# Patient Record
Sex: MALE | Race: BLACK OR AFRICAN AMERICAN | Employment: FULL TIME | URBAN - METROPOLITAN AREA
[De-identification: names, ages, dates, MRNs, and addresses within clinical notes are randomized per-mention and may not be internally consistent; named-entity substitution may affect disease eponyms.]

---

## 2021-02-06 ENCOUNTER — HOSPITAL ENCOUNTER (EMERGENCY)
Age: 26
Discharge: HOME | End: 2021-02-06
Attending: EMERGENCY MEDICINE
Payer: COMMERCIAL

## 2021-02-06 VITALS
HEART RATE: 54 BPM | OXYGEN SATURATION: 98 % | WEIGHT: 235 LBS | BODY MASS INDEX: 31.83 KG/M2 | TEMPERATURE: 98.5 F | DIASTOLIC BLOOD PRESSURE: 80 MMHG | HEIGHT: 72 IN | SYSTOLIC BLOOD PRESSURE: 135 MMHG | RESPIRATION RATE: 18 BRPM

## 2021-02-06 DIAGNOSIS — M54.41 ACUTE MIDLINE LOW BACK PAIN WITH RIGHT-SIDED SCIATICA: Primary | ICD-10-CM

## 2021-02-06 DIAGNOSIS — H61.23 BILATERAL IMPACTED CERUMEN: ICD-10-CM

## 2021-02-06 PROCEDURE — 74011250637 HC RX REV CODE- 250/637: Performed by: PHYSICIAN ASSISTANT

## 2021-02-06 PROCEDURE — 99284 EMERGENCY DEPT VISIT MOD MDM: CPT

## 2021-02-06 RX ADMIN — CARBAMIDE PEROXIDE 6.5% 5 DROP: 6.5 LIQUID AURICULAR (OTIC) at 16:20

## 2021-02-06 NOTE — LETTER
[facility] EMERGENCY DEPT 
[address] [facility] [address] 
[phone] Work/School Note Date: 2/6/2021 To Whom It May concern: 
 
Mika Mcleod was seen and treated today in the emergency room by the following provider(s): 
Attending Provider: Odell Boast, DO Physician Assistant: Paulina Walker. The tympanic membrane is completely visible and intact b/l.   
 
Sincerely, 
 
 
 
 
GHADA Rivera

## 2021-02-06 NOTE — DISCHARGE INSTRUCTIONS
Take medication as prescribed. Schedule MRI as an outpatient. Follow-up with your primary care physician within 2 days for reassessment. Bring the results from this visit with you for their review. Return to the ED immediately for any new, worsening, or persistent symptoms.

## 2021-02-06 NOTE — ED TRIAGE NOTES
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 He c/o lower back pain and states that he experienced paresthesias to right foot following injury. Denies paresthesias at present. He presents today for referral for MRI of spine. He also requests evaluation of ear wax problem to bilateral ears.

## 2021-02-06 NOTE — ED NOTES
I have reviewed discharge instructions with the patient. The patient verbalized understanding.  Pt dc'd by PA

## 2021-02-06 NOTE — ED PROVIDER NOTES
EMERGENCY DEPARTMENT HISTORY AND PHYSICAL EXAM    2:51 PM      Date: 2/6/2021  Patient Name: Guevara Noland    History of Presenting Illness     Chief Complaint   Patient presents with   [facility] Fall    Back Pain    Referral Request    Wax in Ear       History Provided By: Patient    Additional History (Context): Guevara Noland is a 30 y.o. male with No significant past medical history who presents to the ED due to right-sided/midline low back pain associated with numbness and tingling radiating to the right lower extremity x2 weeks. Patient notes he fell about 3 feet onto his back at work. Notes numbness and tingling has resolved. Notes he was evaluated by a neurosurgeon who recommended outpatient MRI without contrast.  Notes he came to the ED for referral for MRI. Patient denies fever or chills, urinary or bowel incontinence or retention, history of IV drug abuse, history of cancer, saddle anesthesia, weakness of extremity. Also has paperwork that states he has bilateral cerumen impaction. Notes he needs ear irrigation for clearance for work. Denies dizziness, headache, ear drainage, or any other medical concerns. PCP: None      Past History     Past Medical History:  History reviewed. No pertinent past medical history. Past Surgical History:  History reviewed. No pertinent surgical history. Family History:  History reviewed. No pertinent family history. Social History:  Social History     Tobacco Use    Smoking status: Never Smoker    Smokeless tobacco: Never Used   Substance Use Topics    Alcohol use: No    Drug use: No       Allergies:  No Known Allergies      Review of Systems       Review of Systems   Constitutional: Negative for chills and fever. HENT: Positive for ear pain. Respiratory: Negative for shortness of breath. Cardiovascular: Negative for chest pain. Gastrointestinal: Negative for abdominal pain, nausea and vomiting. Musculoskeletal: Positive for back pain. Skin: Negative for rash. Neurological: Positive for numbness. Negative for weakness. All other systems reviewed and are negative. Physical Exam     Visit Vitals  /80 (BP 1 Location: Left upper arm, BP Patient Position: At rest;Sitting)   Pulse (!) 54   Temp 98.5 °F (36.9 °C)   Resp 18   Ht 6' (1.829 m)   Wt 106.6 kg (235 lb)   SpO2 98%   BMI 31.87 kg/m²         Physical Exam  Vitals signs and nursing note reviewed. Constitutional:       General: He is not in acute distress. Appearance: Normal appearance. He is well-developed. He is not ill-appearing, toxic-appearing or diaphoretic. HENT:      Head: Normocephalic and atraumatic. Right Ear: There is impacted cerumen. Left Ear: There is impacted cerumen. Mouth/Throat:      Mouth: Mucous membranes are moist.      Pharynx: No oropharyngeal exudate or posterior oropharyngeal erythema. Neck:      Musculoskeletal: Normal range of motion and neck supple. No neck rigidity. Cardiovascular:      Rate and Rhythm: Normal rate and regular rhythm. Heart sounds: Normal heart sounds. No murmur. No friction rub. No gallop. Pulmonary:      Effort: Pulmonary effort is normal. No respiratory distress. Breath sounds: Normal breath sounds. No wheezing or rales. Abdominal:      General: Bowel sounds are normal. There is no distension. Palpations: Abdomen is soft. Tenderness: There is no abdominal tenderness. There is no guarding or rebound. Musculoskeletal: Normal range of motion. Lumbar back: He exhibits tenderness (right lumbar paravertebrals TTP). He exhibits normal range of motion, no bony tenderness, no swelling, no edema, no deformity and no spasm. Comments: No saddle anesthesia, negative SLR b/l, full ROM of hip and knee, normal gait    Lymphadenopathy:      Cervical: No cervical adenopathy. Skin:     General: Skin is warm and dry. Findings: No rash.    Neurological:      Mental Status: He is alert.           Diagnostic Study Results     Labs -  No results found for this or any previous visit (from the past 12 hour(s)). Radiologic Studies -   MRI LUMB SPINE WO CONT    (Results Pending)         Medical Decision Making   I am the first provider for this patient. I reviewed the vital signs, available nursing notes, past medical history, past surgical history, family history and social history. Vital Signs-Reviewed the patient's vital signs. Records Reviewed: Nursing Notes and Old Medical Records (Time of Review: 2:51 PM)    ED Course: Progress Notes, Reevaluation, and Consults:  5:10 PM: Ear irrigation completed by nurse Kym Easton. TM intact bilaterally, canal clear of any wax. No evidence of trauma. Reviewed plan with patient. Discussed need for close outpatient follow-up. Discussed strict return precautions, including fever, weakness, or any other medical concerns. Provider Notes (Medical Decision Making): 49-year-old male who presents to the ED due to right-sided low back pain associated with numbness and tingling to the right lower extremity. Afebrile, nontoxic-appearing, looks well. No red flag symptoms. Do not suspect cauda equina, epidural abscess. Provided referral for MRI. Patient also with bilateral cerumen impaction, resolved with irrigation. No evidence of otitis media/externa, mastoiditis. Stable for discharge with symptomatic management, close outpatient follow-up for further assessment. Diagnosis     Clinical Impression:   1. Acute midline low back pain with right-sided sciatica    2.  Bilateral impacted cerumen        Disposition: home     Follow-up Information     Follow up With Specialties Details Why [place] [address] EMERGENCY DEPT Emergency Medicine  If symptoms worsen [address] [place] [address]  Schedule an appointment as soon as possible for a visit   [address] [facility] [address] [facility]           Patient's Medications   Start Taking    CARBAMIDE PEROXIDE (DEBROX) 6.5 % OTIC SOLUTION    Apply 5 drops to affect ear(s) two times a day until ear is no longer clogged. Keep drops in for 15 minutes at a time by keeping ear facing upward when lying down for 15 minutes. Continue Taking    No medications on file   These Medications have changed    No medications on file   Stop Taking    No medications on file       Dictation disclaimer:  Please note that this dictation was completed with [employer]ect, the computer voice recognition software. Quite often unanticipated grammatical, syntax, homophones, and other interpretive errors are inadvertently transcribed by the computer software. Please disregard these errors. Please excuse any errors that have escaped final proofreading.

## 2021-02-06 NOTE — Clinical Note
[address] [place] [address] [facility] EMERGENCY DEPT 
[address] [facility] [address] [phone] Work/School Note Date: 2/6/2021 To Whom It May concern: 
 
 
Nancy Salgado was seen and treated today in the emergency room by the following provider(s): 
Attending Provider: Kristen Fernandez DO Physician Assistant: Paulina Gutierrez. Nancy Salgado is excused from work/school on 02/06/21. He is clear to return to work/school on 02/07/21.    
 
 
Sincerely, 
 
 
 
 
GHADA Boyce

## 2021-02-26 ENCOUNTER — HOSPITAL ENCOUNTER (OUTPATIENT)
Age: 26
Discharge: HOME | End: 2021-02-26
Attending: PHYSICIAN ASSISTANT
Payer: COMMERCIAL

## 2021-02-26 DIAGNOSIS — M54.41 ACUTE MIDLINE LOW BACK PAIN WITH RIGHT-SIDED SCIATICA: ICD-10-CM

## 2021-02-26 PROCEDURE — 72148 MRI LUMBAR SPINE W/O DYE: CPT

## 2021-03-31 NOTE — PROGRESS NOTES
Patient house hunting during rooming process. Advised to reschedule at a more convenient time for him. This encounter was created in error - please disregard.

## 2021-04-05 ENCOUNTER — VIRTUAL VISIT (OUTPATIENT)
Dept: FAMILY MEDICINE CLINIC | Age: 26
End: 2021-04-05

## 2022-04-20 ENCOUNTER — HOSPITAL ENCOUNTER (EMERGENCY)
Age: 27
Discharge: HOME | End: 2022-04-20
Attending: EMERGENCY MEDICINE
Payer: COMMERCIAL

## 2022-04-20 VITALS
OXYGEN SATURATION: 97 % | HEIGHT: 72 IN | BODY MASS INDEX: 31.15 KG/M2 | RESPIRATION RATE: 16 BRPM | HEART RATE: 61 BPM | WEIGHT: 230 LBS | TEMPERATURE: 98.4 F | DIASTOLIC BLOOD PRESSURE: 96 MMHG | SYSTOLIC BLOOD PRESSURE: 151 MMHG

## 2022-04-20 DIAGNOSIS — N34.2 URETHRITIS: Primary | ICD-10-CM

## 2022-04-20 LAB
APPEARANCE UR: CLEAR
BILIRUB UR QL: NEGATIVE
COLOR UR: YELLOW
GLUCOSE UR STRIP.AUTO-MCNC: NEGATIVE MG/DL
HGB UR QL STRIP: NEGATIVE
KETONES UR QL STRIP.AUTO: NEGATIVE MG/DL
LEUKOCYTE ESTERASE UR QL STRIP.AUTO: NEGATIVE
NITRITE UR QL STRIP.AUTO: NEGATIVE
PH UR STRIP: 6.5 [PH] (ref 5–8)
PROT UR STRIP-MCNC: NEGATIVE MG/DL
SP GR UR REFRACTOMETRY: 1.02 (ref 1–1.03)
UROBILINOGEN UR QL STRIP.AUTO: 1 EU/DL (ref 0.2–1)

## 2022-04-20 PROCEDURE — 87661 TRICHOMONAS VAGINALIS AMPLIF: CPT

## 2022-04-20 PROCEDURE — 87491 CHLMYD TRACH DNA AMP PROBE: CPT

## 2022-04-20 PROCEDURE — 87109 MYCOPLASMA: CPT

## 2022-04-20 PROCEDURE — 74011000250 HC RX REV CODE- 250: Performed by: EMERGENCY MEDICINE

## 2022-04-20 PROCEDURE — 96372 THER/PROPH/DIAG INJ SC/IM: CPT

## 2022-04-20 PROCEDURE — 99284 EMERGENCY DEPT VISIT MOD MDM: CPT

## 2022-04-20 PROCEDURE — 87086 URINE CULTURE/COLONY COUNT: CPT

## 2022-04-20 PROCEDURE — 74011250637 HC RX REV CODE- 250/637: Performed by: EMERGENCY MEDICINE

## 2022-04-20 PROCEDURE — 81003 URINALYSIS AUTO W/O SCOPE: CPT

## 2022-04-20 PROCEDURE — 74011250636 HC RX REV CODE- 250/636: Performed by: EMERGENCY MEDICINE

## 2022-04-20 RX ORDER — DOXYCYCLINE HYCLATE 100 MG
100 TABLET ORAL 2 TIMES DAILY
Qty: 20 TABLET | Refills: 0 | Status: SHIPPED | OUTPATIENT
Start: 2022-04-20 | End: 2022-04-30

## 2022-04-20 RX ORDER — DOXYCYCLINE 100 MG/1
100 CAPSULE ORAL
Status: COMPLETED | OUTPATIENT
Start: 2022-04-20 | End: 2022-04-20

## 2022-04-20 RX ORDER — METRONIDAZOLE 500 MG/1
2000 TABLET ORAL
Status: COMPLETED | OUTPATIENT
Start: 2022-04-20 | End: 2022-04-20

## 2022-04-20 RX ADMIN — DOXYCYCLINE HYCLATE 100 MG: 100 CAPSULE ORAL at 11:07

## 2022-04-20 RX ADMIN — CEFTRIAXONE SODIUM 500 MG: 500 INJECTION, POWDER, FOR SOLUTION INTRAMUSCULAR; INTRAVENOUS at 11:07

## 2022-04-20 RX ADMIN — METRONIDAZOLE 2000 MG: 500 TABLET ORAL at 11:07

## 2022-04-20 NOTE — ED TRIAGE NOTES
Patient voices concerns for possible STD continuing since last visit to [facility] ER on Tuesday of last week. He states completing STD regimen as prescribed. C/o continuing penile discharge.

## 2022-04-20 NOTE — ED PROVIDER NOTES
EMERGENCY DEPARTMENT HISTORY AND PHYSICAL EXAM    10:32 AM    Date: 4/20/2022  Patient Name: Britt Parikh    History of Presenting Illness     Chief Complaint   Patient presents with    Penile Discharge       History Provided By: Patient  Location/Duration/Severity/Modifying factors   70-year-old male presenting with penile discharge. Reports has been going on for about 2 weeks, was seen at [facility] about a week ago, received Rocephin and doxycycline and reports had initial improvement but then this morning the symptoms recurred. He denies any pain associated with any pelvic pain or pain with defecation. Denies any fever abdominal or flank pain. Denies any specific concerns for STDs or any known exposure to anything. He reports that the discharge was somewhat foul-smelling. PCP: None    Current Facility-Administered Medications   Medication Dose Route Frequency Provider Last Rate Last Admin    cefTRIAXone (ROCEPHIN) 500 mg in lidocaine (PF) (XYLOCAINE) 10 mg/mL (1 %) IM injection  500 mg IntraMUSCular Adrián Charlton,         doxycycline (VIBRAMYCIN) capsule 100 mg  100 mg Oral NOW Supriya WARNER DO        metroNIDAZOLE (FLAGYL) tablet 2,000 mg  2,000 mg Oral NOW Seng Crowe DO           Past History     Past Medical History:  History reviewed. No pertinent past medical history. Past Surgical History:  History reviewed. No pertinent surgical history. Family History:  History reviewed. No pertinent family history. Social History:  Social History     Tobacco Use    Smoking status: Never Smoker    Smokeless tobacco: Never Used   Vaping Use    Vaping Use: Never used   Substance Use Topics    Alcohol use: Not Currently    Drug use: No       Allergies:  No Known Allergies    I reviewed and confirmed the above information with patient and updated as necessary. Review of Systems     Review of Systems   Constitutional: Negative for fever.    Respiratory: Negative for cough and shortness of breath. Cardiovascular: Negative for chest pain. Gastrointestinal: Negative for vomiting. Genitourinary: Positive for penile discharge. Negative for dysuria, frequency, penile pain, penile swelling, scrotal swelling, testicular pain and urgency. Musculoskeletal: Negative for back pain. Neurological: Negative for headaches. Physical Exam     Visit Vitals  BP (!) 151/96 (BP 1 Location: Left upper arm, BP Patient Position: At rest;Sitting)   Pulse 61   Temp 98.4 °F (36.9 °C)   Resp 16   Ht 6' (1.829 m)   Wt 104.3 kg (230 lb)   SpO2 97%   BMI 31.19 kg/m²       Physical Exam  Constitutional:       Appearance: Normal appearance. HENT:      Head: Normocephalic and atraumatic. Right Ear: External ear normal.      Left Ear: External ear normal.      Nose: Nose normal.      Mouth/Throat:      Mouth: Mucous membranes are moist.      Pharynx: Oropharynx is clear. No oropharyngeal exudate or posterior oropharyngeal erythema. Eyes:      Conjunctiva/sclera: Conjunctivae normal.   Cardiovascular:      Rate and Rhythm: Normal rate and regular rhythm. Pulses: Normal pulses. Pulmonary:      Effort: Pulmonary effort is normal.      Breath sounds: Normal breath sounds. Abdominal:      General: Abdomen is flat. Tenderness: There is no abdominal tenderness. Musculoskeletal:         General: No swelling. Normal range of motion. Cervical back: Normal range of motion and neck supple. Right lower leg: No edema. Left lower leg: No edema. Skin:     General: Skin is warm and dry. Neurological:      General: No focal deficit present. Mental Status: He is alert.          Diagnostic Study Results     Labs -  Recent Results (from the past 24 hour(s))   URINALYSIS W/ RFLX MICROSCOPIC    Collection Time: 04/20/22 10:00 AM   Result Value Ref Range    Color YELLOW      Appearance CLEAR      Specific gravity 1.024 1.005 - 1.030      pH (UA) 6.5 5.0 - 8.0 Protein Negative NEG mg/dL    Glucose Negative NEG mg/dL    Ketone Negative NEG mg/dL    Bilirubin Negative NEG      Blood Negative NEG      Urobilinogen 1.0 0.2 - 1.0 EU/dL    Nitrites Negative NEG      Leukocyte Esterase Negative NEG           Radiologic Studies -   No orders to display           Medical Decision Making   I am the first provider for this patient. I reviewed the vital signs, available nursing notes, past medical history, past surgical history, family history and social history. Vital Signs-Reviewed the patient's vital signs. EKG: N/a    Records Reviewed: Nursing Notes, Old Medical Records and Previous Laboratory Studies (Time of Review: 10:32 AM)      Provider Notes (Medical Decision Making):   MDM  Number of Diagnoses or Management Options  Urethritis  Diagnosis management comments: 69-year-old male presenting to the emergency room with complaint of penile discharge. Is been going on for couple of weeks. He was treated with Rocephin and doxycycline on outside hospital.    He reports improvement and then recurrence of pain. Not have testing in [place] we will certainly do the testing today as he gives a urine sample. STD is considered his urinalysis did not reflect any infection. We will also add Ureaplasma and trichomonas as that would not have potentially be covered, Rocephin and doxycycline (the trichomonas). I will go ahead and treat him with Rocephin and doxycycline again as well as a dose of Flagyl here.     Advise follow-up with urology if symptoms do not resolve with this treatment and to return if he is worsening or having new symptoms that are concerning to him    At this time, patient is stable and appropriate for discharge home.  Patient demonstrates understanding of current diagnoses and is in agreement with the treatment plan. Rich Isaacs are advised that while the likelihood of serious underlying condition is low at this point given the evaluation performed today, we cannot fully rule it out. Cale Carroll are advised to immediately return with any new symptoms or worsening of current condition. Any Incidental findings were noted on the patient's discharge paperwork as well as verbally directly to the patient, and the appropriate follow-up was given to the patient as far as instructions on testing needed as well as the timeframe.  All questions have been answered. Jd Mendiola is given educational material regarding their diagnoses, including danger symptoms and when to return to the ED. This note was dictated utilizing Dragon voice recognition software. Unfortunately this leads to occasional typographical errors. I apologize in advance if the situation occurs. If questions occur please do not hesitate to contact me directly. Brice Avila,           ED Course: Progress Notes, Reevaluation, and Consults:       Procedures    Critical Care Time: N/a    Diagnosis     Clinical Impression:   1. Urethritis        Disposition: Discharge    Follow-up Information     Follow up With Specialties Details Why [address] [facility] Department   For additional/ancillary testing such as HIV, hepatitis or other STDs. [address] [facility] EMERGENCY DEPT Emergency Medicine  As needed, If symptoms worsen; or [facility] Emergency Department [address] Eileen Razo MD Urology Call today  [address] [facility]  [address]  [address] [facility]  [phone]             Patient's Medications   Start Taking    No medications on file   Continue Taking    No medications on file   These Medications have changed    No medications on file   Stop Taking    CARBAMIDE PEROXIDE (DEBROX) 6.5 % OTIC SOLUTION    Apply 5 drops to affect ear(s) two times a day until ear is no longer clogged. Keep drops in for 15 minutes at a time by keeping ear facing upward when lying down for 15 minutes. DOXYCYCLINE (VIBRA-TABS) 100 MG TABLET    Take 1 Tablet by mouth two (2) times a day for 7 days. Ceci Akins DO   Emergency Medicine   April 20, 2022, 10:32 AM     This note is dictated utilizing Dragon voice recognition software. Unfortunately this leads to occasional typographical errors using the voice recognition. I apologize in advance if the situation occurs. If questions occur please do not hesitate to contact me directly. Patient was seen  and treated during the context of the COVID-19 pandemic. Contemporary protocols utilized based on the best available evidence, utilizing evolving public health  guidelines and treatment protocols.     Ceci Akins DO

## 2022-04-20 NOTE — DISCHARGE INSTRUCTIONS
1.  The cause of your symptoms has not been fully determined. The testing performed today will help to rule out or rule in different of sexually-transmitted infections. 2.  Take antibiotics as prescribed. These are the same that you are on before, and I ordered you to receive Flagyl here in the emergency department which be treatment specifically for trichomonas. 3.  Follow-up with urology if you still have persistent symptoms after taking the medication. 4.  The results of your testing will take at least a day to come back and that can be obtained by reviewing my chart and you may receive a call. 5.  You should notify any and all partners of the possibility of an STD although the results are not back yet. You should be abstinent for at least 14 days after completion of treatment and ensure any and all partners have been tested and/or treated.

## 2022-04-21 LAB
BACTERIA SPEC CULT: NORMAL
C TRACH RRNA SPEC QL NAA+PROBE: NEGATIVE
N GONORRHOEA RRNA SPEC QL NAA+PROBE: NEGATIVE
SERVICE CMNT-IMP: NORMAL
SPECIMEN SOURCE: NORMAL

## 2022-04-24 LAB
SPECIMEN SOURCE: NORMAL
T VAGINALIS RRNA SPEC QL NAA+PROBE: NEGATIVE

## 2022-04-26 LAB
M HOMINIS SPEC QL CULT: NEGATIVE
SPECIMEN SOURCE: NORMAL
U UREALYTICUM SPEC QL CULT: NEGATIVE